# Patient Record
Sex: FEMALE | NOT HISPANIC OR LATINO | Employment: FULL TIME | URBAN - METROPOLITAN AREA
[De-identification: names, ages, dates, MRNs, and addresses within clinical notes are randomized per-mention and may not be internally consistent; named-entity substitution may affect disease eponyms.]

---

## 2018-09-24 ENCOUNTER — TELEPHONE (OUTPATIENT)
Dept: ENDOCRINOLOGY | Facility: HOSPITAL | Age: 48
End: 2018-09-24

## 2018-09-24 DIAGNOSIS — E03.9 HYPOTHYROIDISM, UNSPECIFIED TYPE: Primary | ICD-10-CM

## 2018-09-24 NOTE — TELEPHONE ENCOUNTER
Called pt to r/s appt  Sami Arriola she has an appt this Friday (9-28-18) to get her b/w done  Needs a new slip for a TSH & Free T4, Diagnosis is Hypothyroidism Post I131 treatment (gave me info from old [facility] lab slip)  Said she's not feeling quite right, thyroid may be a little underactive  Would rather get the b/w done now & call next week for the results  Will r/s then  Fax the order to [address] [place] [address]

## 2018-09-25 NOTE — TELEPHONE ENCOUNTER
Faxed lab slip to [employer] (fax #[phone])  Pt said she'd r/s when she called for results

## 2018-09-27 LAB — HBA1C MFR BLD HPLC: 5.4 %

## 2018-11-19 DIAGNOSIS — E03.9 HYPOTHYROIDISM, UNSPECIFIED TYPE: Primary | ICD-10-CM

## 2018-11-19 RX ORDER — LEVOTHYROXINE SODIUM 112 MCG
TABLET ORAL
Qty: 90 TABLET | Refills: 4 | Status: SHIPPED | OUTPATIENT
Start: 2018-11-19 | End: 2019-04-11 | Stop reason: SDUPTHER

## 2019-02-26 ENCOUNTER — OFFICE VISIT (OUTPATIENT)
Dept: ENDOCRINOLOGY | Facility: HOSPITAL | Age: 49
End: 2019-02-26
Payer: COMMERCIAL

## 2019-02-26 VITALS
HEART RATE: 76 BPM | HEIGHT: 62 IN | WEIGHT: 144.6 LBS | SYSTOLIC BLOOD PRESSURE: 122 MMHG | BODY MASS INDEX: 26.61 KG/M2 | DIASTOLIC BLOOD PRESSURE: 78 MMHG

## 2019-02-26 DIAGNOSIS — E03.9 HYPOTHYROIDISM, UNSPECIFIED TYPE: ICD-10-CM

## 2019-02-26 DIAGNOSIS — E89.0 POSTABLATIVE HYPOTHYROIDISM: Primary | ICD-10-CM

## 2019-02-26 PROBLEM — E05.00 GRAVES DISEASE: Status: ACTIVE | Noted: 2019-02-26

## 2019-02-26 PROBLEM — Z92.3 STATUS POST RADIOACTIVE IODINE THYROID ABLATION: Status: ACTIVE | Noted: 2019-02-26

## 2019-02-26 PROCEDURE — 99214 OFFICE O/P EST MOD 30 MIN: CPT | Performed by: INTERNAL MEDICINE

## 2019-02-26 RX ORDER — MULTIVIT-MIN/IRON FUM/FOLIC AC 7.5 MG-4
1 TABLET ORAL DAILY
COMMUNITY

## 2019-02-26 RX ORDER — FLUOXETINE 10 MG/1
10 CAPSULE ORAL DAILY
COMMUNITY
Start: 2019-02-20

## 2019-02-26 RX ORDER — SELENIUM 50 MCG
1 TABLET ORAL DAILY
COMMUNITY

## 2019-02-26 RX ORDER — CHLORAL HYDRATE 500 MG
1000 CAPSULE ORAL 2 TIMES DAILY
COMMUNITY

## 2019-02-26 RX ORDER — AMPICILLIN TRIHYDRATE 250 MG
CAPSULE ORAL DAILY
COMMUNITY

## 2019-02-26 NOTE — PATIENT INSTRUCTIONS
Last blood work in September 2018 was normal    Continue the same synthroid 112 mcg daily  Follow up in 1 year with blood work

## 2019-02-26 NOTE — LETTER
February 26, 2019     Lyle Nielsen MD  [facility] [address] [place] [address]    Patient: Kirsten Caballero   YOB: 1970   Date of Visit: 2/26/2019       Dear Dr Benítez Client: Thank you for referring Kirsten Caballero to me for evaluation  Below are my notes for this consultation  If you have questions, please do not hesitate to call me  I look forward to following your patient along with you  Sincerely,        Syed Alonzo MD        CC: No Recipients  Syed Alonzo MD  2/26/2019  9:28 AM  Sign at close encounter  2/26/2019    Assessment/Plan      Diagnoses and all orders for this visit:    Postablative hypothyroidism  -     T4, free Lab Collect; Future  -     TSH, 3rd generation Lab Collect; Future    Hypothyroidism, unspecified type    Other orders  -     FLUoxetine (PROzac) 10 mg capsule; Take 10 mg by mouth daily   -     Omega-3 Fatty Acids (FISH OIL) 1,000 mg; Take 1,000 mg by mouth 2 (two) times a day   -     Red Yeast Rice 600 MG CAPS; Take by mouth daily  -     Multiple Vitamins-Minerals (MULTIVITAMIN WITH MINERALS) tablet; Take 1 tablet by mouth daily  -     lactobacillus acidophilus; Take 1 capsule by mouth daily  -     Flaxseed, Linseed, (FLAXSEED OIL PO); Take by mouth daily        Assessment/Plan:  1  Hypothyroidism post I 131 treatment  Most recent thyroid function tests do show that she is biochemically euthyroid with a TSH in the low-normal range  For now, she will continue the same Synthroid 112 mcg daily  2  History of Graves disease  She is post radioactive iodine treatment  She has no evidence of Graves ophthalmopathy and follows with an eye doctor on a regular basis  I have asked her to follow up in 1 year with preceding TSH and free T4         CC:  Hypothyroid follow-up    History of Present Illness     HPI: Kirsten Caballero is a 57y o  year old female with history of hypothyroidism post I 131 treatment for Graves disease    She was diagnosed with Graves disease many years ago  She is post I 131 treatment and is now hypothyroid  She is on Synthroid 112 mcg daily  She denies heat or cold intolerance, diarrhea or constipation, palpitation, or depression  Her anxiety is under control with the Prozac  She does have a lot of sleeping issues because she worries about her  not waking up from sleep from hypoglycemia with his type 1 diabetes  She will wake up frequently to check on him  She is denies fatigue  She weight is been stable  She has occasional hand tremors  She has dry skin, but no brittle nails or hair loss  Menstrual cycles do occur on a regular monthly basis  She denies diplopia  She has no compressive thyroid symptoms or difficulties with swallowing  Review of Systems   Constitutional: Negative for fatigue and unexpected weight change  HENT: Negative for hearing loss, tinnitus and trouble swallowing  Eyes: Negative for visual disturbance  No diplopia  Wears glasses  Respiratory: Negative for chest tightness and shortness of breath  Cardiovascular: Negative for chest pain, palpitations and leg swelling  Gastrointestinal: Negative for abdominal pain, constipation, diarrhea and nausea  Endocrine: Negative for cold intolerance and heat intolerance  Genitourinary:        Menses occur on a regular monthly basis  Musculoskeletal: Positive for back pain  Negative for arthralgias  Some low back pain  Skin: Negative for rash  Some dry skin  No Brittle nails or hair loss  Neurological: Positive for tremors and headaches  Negative for dizziness, light-headedness and numbness  Slight tremors  Has chronic headaches  Psychiatric/Behavioral: Positive for sleep disturbance  Negative for dysphoric mood  The patient is nervous/anxious  Sleeping still broken by worry over  and low blood sugars with diabetes  Anxiety controlled on Prozac except near menses         Historical Information   Past Medical History:   Diagnosis Date    Anxiety     IBS (irritable bowel syndrome)     Renal stones     Stenosis of tear duct     left     Past Surgical History:   Procedure Laterality Date    EYE SURGERY Left     tear duct surgery as a child    LITHOTRIPSY      TONSILLECTOMY      TUBAL LIGATION Bilateral      Social History   Social History     Substance and Sexual Activity   Alcohol Use Not Currently     Social History     Substance and Sexual Activity   Drug Use Never     Social History     Tobacco Use   Smoking Status Never Smoker   Smokeless Tobacco Never Used     Family History:   Family History   Problem Relation Age of Onset    Hypertension Mother    [employer] Hypothyroidism Father     Hypertension Father     Thyroid disease unspecified Sister         graves, post I131    Hypothyroidism Sister     Thyroid disease unspecified Maternal Grandmother     Thyroid disease unspecified Sister     No Known Problems Daughter     No Known Problems Daughter     Thyroid disease unspecified Daughter         has thyroid antibodies       Meds/Allergies   Current Outpatient Medications   Medication Sig Dispense Refill    Flaxseed, Linseed, (FLAXSEED OIL PO) Take by mouth daily      FLUoxetine (PROzac) 10 mg capsule Take 10 mg by mouth daily       lactobacillus acidophilus Take 1 capsule by mouth daily      Multiple Vitamins-Minerals (MULTIVITAMIN WITH MINERALS) tablet Take 1 tablet by mouth daily      Omega-3 Fatty Acids (FISH OIL) 1,000 mg Take 1,000 mg by mouth 2 (two) times a day       Red Yeast Rice 600 MG CAPS Take by mouth daily      SYNTHROID 112 MCG tablet TAKE 1 TABLET DAILY 90 tablet 4     No current facility-administered medications for this visit  No Known Allergies    Objective   Vitals: Blood pressure 122/78, pulse 76, height 5' 2" (1 575 m), weight 65 6 kg (144 lb 9 6 oz)  Invasive Devices          None          Physical Exam   Constitutional: She is oriented to person, place, and time   She appears well-developed and well-nourished  HENT:   Head: Normocephalic and atraumatic  Eyes: Pupils are equal, round, and reactive to light  Conjunctivae and EOM are normal    No lid lag, stare, proptosis, or periorbital edema  Neck: Normal range of motion  Neck supple  No thyromegaly present  Thyroid normal in size without palpable thyroid nodules  No bruits over the thyroid gland or carotids  Cardiovascular: Normal rate, regular rhythm and normal heart sounds  No murmur heard  Pulmonary/Chest: Effort normal and breath sounds normal  She has no wheezes  Abdominal: Soft  There is no tenderness  Musculoskeletal: Normal range of motion  She exhibits no edema or deformity  Slight fine tremor of the outstretched hands  No spinous process tenderness  No CVA tenderness  Lymphadenopathy:     She has no cervical adenopathy  Neurological: She is alert and oriented to person, place, and time  She has normal reflexes  Deep tendon reflexes normal without briskness  Skin: Skin is warm and dry  No rash noted  Vitals reviewed  The history was obtained from the review of the chart, [facility] end[facility], and from the patient  Lab Results:    Blood work done at [facility] on 09/27/2018 showed hemoglobin A1c of 5 4%  TSH was 0 708    CMP and CBC were normal     Future Appointments   Date Time Provider Alicia Oh   2/27/2020  4:00 PM Dinora Topete MD [facility]

## 2019-02-26 NOTE — PROGRESS NOTES
2/26/2019    Assessment/Plan      Diagnoses and all orders for this visit:    Postablative hypothyroidism  -     T4, free Lab Collect; Future  -     TSH, 3rd generation Lab Collect; Future    Hypothyroidism, unspecified type    Other orders  -     FLUoxetine (PROzac) 10 mg capsule; Take 10 mg by mouth daily   -     Omega-3 Fatty Acids (FISH OIL) 1,000 mg; Take 1,000 mg by mouth 2 (two) times a day   -     Red Yeast Rice 600 MG CAPS; Take by mouth daily  -     Multiple Vitamins-Minerals (MULTIVITAMIN WITH MINERALS) tablet; Take 1 tablet by mouth daily  -     lactobacillus acidophilus; Take 1 capsule by mouth daily  -     Flaxseed, Linseed, (FLAXSEED OIL PO); Take by mouth daily        Assessment/Plan:  1  Hypothyroidism post I 131 treatment  Most recent thyroid function tests do show that she is biochemically euthyroid with a TSH in the low-normal range  For now, she will continue the same Synthroid 112 mcg daily  2  History of Graves disease  She is post radioactive iodine treatment  She has no evidence of Graves ophthalmopathy and follows with an eye doctor on a regular basis  I have asked her to follow up in 1 year with preceding TSH and free T4         CC:  Hypothyroid follow-up    History of Present Illness     HPI: Matthew Eng is a 57y o  year old female with history of hypothyroidism post I 131 treatment for Graves disease  She was diagnosed with Graves disease many years ago  She is post I 131 treatment and is now hypothyroid  She is on Synthroid 112 mcg daily  She denies heat or cold intolerance, diarrhea or constipation, palpitation, or depression  Her anxiety is under control with the Prozac  She does have a lot of sleeping issues because she worries about her  not waking up from sleep from hypoglycemia with his type 1 diabetes  She will wake up frequently to check on him  She is denies fatigue  She weight is been stable  She has occasional hand tremors    She has dry skin, but no brittle nails or hair loss  Menstrual cycles do occur on a regular monthly basis  She denies diplopia  She has no compressive thyroid symptoms or difficulties with swallowing  Review of Systems   Constitutional: Negative for fatigue and unexpected weight change  HENT: Negative for hearing loss, tinnitus and trouble swallowing  Eyes: Negative for visual disturbance  No diplopia  Wears glasses  Respiratory: Negative for chest tightness and shortness of breath  Cardiovascular: Negative for chest pain, palpitations and leg swelling  Gastrointestinal: Negative for abdominal pain, constipation, diarrhea and nausea  Endocrine: Negative for cold intolerance and heat intolerance  Genitourinary:        Menses occur on a regular monthly basis  Musculoskeletal: Positive for back pain  Negative for arthralgias  Some low back pain  Skin: Negative for rash  Some dry skin  No Brittle nails or hair loss  Neurological: Positive for tremors and headaches  Negative for dizziness, light-headedness and numbness  Slight tremors  Has chronic headaches  Psychiatric/Behavioral: Positive for sleep disturbance  Negative for dysphoric mood  The patient is nervous/anxious  Sleeping still broken by worry over  and low blood sugars with diabetes  Anxiety controlled on Prozac except near menses         Historical Information   Past Medical History:   Diagnosis Date    Anxiety     IBS (irritable bowel syndrome)     Renal stones     Stenosis of tear duct     left     Past Surgical History:   Procedure Laterality Date    EYE SURGERY Left     tear duct surgery as a child    LITHOTRIPSY      TONSILLECTOMY      TUBAL LIGATION Bilateral      Social History   Social History     Substance and Sexual Activity   Alcohol Use Not Currently     Social History     Substance and Sexual Activity   Drug Use Never     Social History     Tobacco Use   Smoking Status Never Smoker Smokeless Tobacco Never Used     Family History:   Family History   Problem Relation Age of Onset    Hypertension Mother    [employer] Lemme Hypothyroidism Father     Hypertension Father     Thyroid disease unspecified Sister         graves, post I131    Hypothyroidism Sister     Thyroid disease unspecified Maternal Grandmother     Thyroid disease unspecified Sister     No Known Problems Daughter     No Known Problems Daughter     Thyroid disease unspecified Daughter         has thyroid antibodies       Meds/Allergies   Current Outpatient Medications   Medication Sig Dispense Refill    Flaxseed, Linseed, (FLAXSEED OIL PO) Take by mouth daily      FLUoxetine (PROzac) 10 mg capsule Take 10 mg by mouth daily       lactobacillus acidophilus Take 1 capsule by mouth daily      Multiple Vitamins-Minerals (MULTIVITAMIN WITH MINERALS) tablet Take 1 tablet by mouth daily      Omega-3 Fatty Acids (FISH OIL) 1,000 mg Take 1,000 mg by mouth 2 (two) times a day       Red Yeast Rice 600 MG CAPS Take by mouth daily      SYNTHROID 112 MCG tablet TAKE 1 TABLET DAILY 90 tablet 4     No current facility-administered medications for this visit  No Known Allergies    Objective   Vitals: Blood pressure 122/78, pulse 76, height 5' 2" (1 575 m), weight 65 6 kg (144 lb 9 6 oz)  Invasive Devices          None          Physical Exam   Constitutional: She is oriented to person, place, and time  She appears well-developed and well-nourished  HENT:   Head: Normocephalic and atraumatic  Eyes: Pupils are equal, round, and reactive to light  Conjunctivae and EOM are normal    No lid lag, stare, proptosis, or periorbital edema  Neck: Normal range of motion  Neck supple  No thyromegaly present  Thyroid normal in size without palpable thyroid nodules  No bruits over the thyroid gland or carotids  Cardiovascular: Normal rate, regular rhythm and normal heart sounds  No murmur heard    Pulmonary/Chest: Effort normal and breath sounds normal  She has no wheezes  Abdominal: Soft  There is no tenderness  Musculoskeletal: Normal range of motion  She exhibits no edema or deformity  Slight fine tremor of the outstretched hands  No spinous process tenderness  No CVA tenderness  Lymphadenopathy:     She has no cervical adenopathy  Neurological: She is alert and oriented to person, place, and time  She has normal reflexes  Deep tendon reflexes normal without briskness  Skin: Skin is warm and dry  No rash noted  Vitals reviewed  The history was obtained from the review of the chart, [facility] endocrinology notes, and from the patient  Lab Results:    Blood work done at [address] [place] [address] on 09/27/2018 showed hemoglobin A1c of 5 4%  TSH was 0 708    CMP and CBC were normal     Future Appointments   Date Time Provider Alicia Althea   2/27/2020  4:00 PM Thi Gilman MD [facility]

## 2019-04-11 DIAGNOSIS — E03.9 HYPOTHYROIDISM, UNSPECIFIED TYPE: ICD-10-CM

## 2019-04-11 RX ORDER — LEVOTHYROXINE SODIUM 112 MCG
112 TABLET ORAL DAILY
Qty: 30 TABLET | Refills: 11 | Status: SHIPPED | OUTPATIENT
Start: 2019-04-11 | End: 2020-03-02 | Stop reason: SDUPTHER

## 2019-09-26 LAB — HBA1C MFR BLD HPLC: 5.4 %

## 2020-03-02 ENCOUNTER — OFFICE VISIT (OUTPATIENT)
Dept: ENDOCRINOLOGY | Facility: HOSPITAL | Age: 50
End: 2020-03-02
Payer: COMMERCIAL

## 2020-03-02 VITALS
BODY MASS INDEX: 25.27 KG/M2 | WEIGHT: 142.6 LBS | HEIGHT: 63 IN | DIASTOLIC BLOOD PRESSURE: 82 MMHG | SYSTOLIC BLOOD PRESSURE: 120 MMHG | HEART RATE: 83 BPM

## 2020-03-02 DIAGNOSIS — E05.00 GRAVES DISEASE: ICD-10-CM

## 2020-03-02 DIAGNOSIS — Z92.3 STATUS POST RADIOACTIVE IODINE THYROID ABLATION: ICD-10-CM

## 2020-03-02 DIAGNOSIS — E89.0 POSTABLATIVE HYPOTHYROIDISM: Primary | ICD-10-CM

## 2020-03-02 DIAGNOSIS — E03.9 HYPOTHYROIDISM, UNSPECIFIED TYPE: ICD-10-CM

## 2020-03-02 PROCEDURE — 99213 OFFICE O/P EST LOW 20 MIN: CPT | Performed by: INTERNAL MEDICINE

## 2020-03-02 RX ORDER — LEVOTHYROXINE SODIUM 112 MCG
112 TABLET ORAL DAILY
Qty: 30 TABLET | Refills: 11 | Status: SHIPPED | OUTPATIENT
Start: 2020-03-02 | End: 2021-03-08 | Stop reason: SDUPTHER

## 2020-03-02 NOTE — PROGRESS NOTES
3/2/2020    Assessment/Plan      Diagnoses and all orders for this visit:    Postablative hypothyroidism  -     TSH, 3rd generation Lab Collect; Future  -     T4, free Lab Collect; Future    Graves disease  -     TSH, 3rd generation Lab Collect; Future  -     T4, free Lab Collect; Future    Status post radioactive iodine thyroid ablation  -     TSH, 3rd generation Lab Collect; Future  -     T4, free Lab Collect; Future    Hypothyroidism, unspecified type  -     SYNTHROID 112 MCG tablet; Take 1 tablet (112 mcg total) by mouth daily        Assessment/Plan:  1  Hypothyroidism post I 131 treatment  Thyroid function tests are normal   She is both clinically and biochemically euthyroid  She will continue the same Synthroid brand 112 mcg daily  2  History of Graves disease  She has no evidence of Graves ophthalmopathy  She sees an ophthalmologist on a regular basis  I have asked her to follow up in 1 year with preceding TSH and free T4         CC:  Hypothyroid follow-up    History of Present Illness     HPI: Angela Marie is a 54y o  year old female with history of hypothyroidism post I 131 treatment for Graves disease here for follow-up  She was diagnosed with Graves disease many years ago and underwent I 131 treatment to ablate her thyroid and is now permanently hypothyroid on thyroid hormone replacement  She currently takes Synthroid brand 112 mcg daily  Weight is 2 lb less than last year  She is sleeping better now that her  has gotten a DEX com and it alarms when he has hypoglycemia instead of her having to wake up  She denies heat or cold intolerance, diarrhea or constipation, anxiety or depression, palpitation, tremors, or fatigue  She has no dry skin, brittle nails, or hair loss  She has no compressive thyroid symptoms or difficulties with swallowing  She denies diplopia  Menstrual cycles are becoming more irregular about every 2 months and lasting about 2 weeks      Review of Systems Constitutional: Negative for fatigue and unexpected weight change  Weight 2 lbs less than last visit  HENT: Negative for trouble swallowing  Eyes: Negative for visual disturbance  No diplopia  Wears glasses  Respiratory: Negative for chest tightness and shortness of breath  Cardiovascular: Negative for chest pain and palpitations  Gastrointestinal: Negative for abdominal pain, constipation, diarrhea and nausea  Endocrine: Negative for cold intolerance and heat intolerance  Genitourinary:        Menses becoming more irregular, about every 2 months and last around 2 weeks  Skin: Negative for rash  No dry skin  No brittle lucina  No hair loss  Neurological: Negative for dizziness, tremors, light-headedness and headaches  Psychiatric/Behavioral: Negative for dysphoric mood and sleep disturbance  The patient is not nervous/anxious          Historical Information   Past Medical History:   Diagnosis Date    Anxiety     Glaucoma     IBS (irritable bowel syndrome)     Renal stones     Stenosis of tear duct     left     Past Surgical History:   Procedure Laterality Date    EYE SURGERY Left     tear duct surgery as a child    LITHOTRIPSY      TONSILLECTOMY      TRABECULECTOMY Bilateral 2019    TUBAL LIGATION Bilateral      Social History   Social History     Substance and Sexual Activity   Alcohol Use Not Currently     Social History     Substance and Sexual Activity   Drug Use Never     Social History     Tobacco Use   Smoking Status Never Smoker   Smokeless Tobacco Never Used     Family History:   Family History   Problem Relation Age of Onset    Hypertension Mother    [facility] Hypothyroidism Father     Hypertension Father     Thyroid disease unspecified Sister         graves, post I131    Hypothyroidism Sister     Thyroid disease unspecified Maternal Grandmother     Thyroid disease unspecified Sister     No Known Problems Daughter     No Known Problems Daughter     Thyroid disease unspecified Daughter         has thyroid antibodies       Meds/Allergies   Current Outpatient Medications   Medication Sig Dispense Refill    Flaxseed, Linseed, (FLAXSEED OIL PO) Take by mouth daily      FLUoxetine (PROzac) 10 mg capsule Take 10 mg by mouth daily       lactobacillus acidophilus Take 1 capsule by mouth daily      Multiple Vitamins-Minerals (MULTIVITAMIN WITH MINERALS) tablet Take 1 tablet by mouth daily      Omega-3 Fatty Acids (FISH OIL) 1,000 mg Take 1,000 mg by mouth 2 (two) times a day       Red Yeast Rice 600 MG CAPS Take by mouth daily      SYNTHROID 112 MCG tablet Take 1 tablet (112 mcg total) by mouth daily 30 tablet 11     No current facility-administered medications for this visit  No Known Allergies    Objective   Vitals: Blood pressure 120/82, pulse 83, height 5' 2 5" (1 588 m), weight 64 7 kg (142 lb 9 6 oz)  Invasive Devices     None                 Physical Exam   Constitutional: She is oriented to person, place, and time  She appears well-developed and well-nourished  HENT:   Head: Normocephalic and atraumatic  Eyes: Pupils are equal, round, and reactive to light  Conjunctivae and EOM are normal    No lid lag, stare, proptosis, or periorbital edema  Neck: Normal range of motion  Neck supple  No thyromegaly present  Thyroid normal in size without palpable thyroid nodules  No bruits over the thyroid gland or carotids  Cardiovascular: Normal rate, regular rhythm and normal heart sounds  No murmur heard  Pulmonary/Chest: Effort normal and breath sounds normal  She has no wheezes  Musculoskeletal: Normal range of motion  She exhibits no edema or deformity  No tremor of the outstretched hands  Lymphadenopathy:     She has no cervical adenopathy  Neurological: She is alert and oriented to person, place, and time  She has normal reflexes  Deep tendon reflexes normal    Skin: Skin is warm and dry  No rash noted  Vitals reviewed        The history was obtained from the review of the chart and from the patient  Lab Results:    Blood work done on 09/26/2019 at [facility] showed a TSH of 1 51  CBC is normal   Hemoglobin A1c is 5 4%  CMP showed a glucose of 88 fasting  Total cholesterol 172, triglyceride 79, HDL 52,          Future Appointments   Date Time Provider Alicia Oh   3/8/2021  8:20 AM Kanika King MD [facility]

## 2020-03-02 NOTE — PATIENT INSTRUCTIONS
The thyrodi blood work is normal   Continue to same synthroid 112 mcg daily  Follow up in 1 year with blood work

## 2021-01-31 LAB
T4 FREE SERPL-MCNC: 1.5 NG/DL (ref 0.8–1.8)
TSH SERPL-ACNC: 1.43 MIU/L

## 2021-03-08 ENCOUNTER — OFFICE VISIT (OUTPATIENT)
Dept: ENDOCRINOLOGY | Facility: HOSPITAL | Age: 51
End: 2021-03-08
Payer: COMMERCIAL

## 2021-03-08 VITALS
HEIGHT: 63 IN | WEIGHT: 145.4 LBS | SYSTOLIC BLOOD PRESSURE: 116 MMHG | HEART RATE: 76 BPM | DIASTOLIC BLOOD PRESSURE: 70 MMHG | BODY MASS INDEX: 25.76 KG/M2

## 2021-03-08 DIAGNOSIS — E05.00 GRAVES DISEASE: ICD-10-CM

## 2021-03-08 DIAGNOSIS — E89.0 POSTABLATIVE HYPOTHYROIDISM: Primary | ICD-10-CM

## 2021-03-08 DIAGNOSIS — Z92.3 STATUS POST RADIOACTIVE IODINE THYROID ABLATION: ICD-10-CM

## 2021-03-08 DIAGNOSIS — E03.9 HYPOTHYROIDISM, UNSPECIFIED TYPE: ICD-10-CM

## 2021-03-08 PROCEDURE — 99213 OFFICE O/P EST LOW 20 MIN: CPT | Performed by: INTERNAL MEDICINE

## 2021-03-08 RX ORDER — LEVOTHYROXINE SODIUM 112 MCG
112 TABLET ORAL DAILY
Qty: 30 TABLET | Refills: 11 | Status: SHIPPED | OUTPATIENT
Start: 2021-03-08 | End: 2022-03-08 | Stop reason: SDUPTHER

## 2021-03-08 NOTE — PATIENT INSTRUCTIONS
The thyroid blood work is normal    Continue the same synthroid 112 mcg daily  follow up in 1 year with blood work

## 2021-03-08 NOTE — PROGRESS NOTES
3/8/2021    Assessment/Plan      Diagnoses and all orders for this visit:    Postablative hypothyroidism  -     TSH, 3rd generation Lab Collect; Future  -     T4, free Lab Collect; Future    Graves disease  -     TSH, 3rd generation Lab Collect; Future  -     T4, free Lab Collect; Future    Status post radioactive iodine thyroid ablation    Hypothyroidism, unspecified type  -     Synthroid 112 MCG tablet; Take 1 tablet (112 mcg total) by mouth daily    Other orders  -     Rhubarb (ESTROVEN MENOPAUSE RELIEF PO); Take by mouth daily With soy        Assessment/Plan:    1  Hypothyroidism post I 131 treatment  Most recent thyroid function tests are normal   She is both biochemically and clinically euthyroid  She will continue the same Synthroid brand 112 mcg daily  2  Graves disease  She has no evidence of Graves ophthalmopathy  I have asked her to follow up in 1 year with preceding TSH and free T4       CC:   Hypothyroid follow-up    History of Present Illness     HPI: Johnny Sampson is a 52y o  year old female with history of  Hypothyroidism post I 131 treatment for Graves disease here for follow-up visit  She was diagnosed with Graves disease many years ago and underwent I 131 treatment to ablate her thyroid  She is permanently hypothyroid on thyroid hormone for replacement purposes  She takes Synthroid brand 112 mcg daily  She has some hot flashes which have improved with Estroven  Menstrual cycles were absent for 4 months but the last 1 was February 27, 2021  She denies heat or cold intolerance, palpitation, diarrhea or constipation, anxiety or depression  She does have some difficulties with frequent awakening at night  She denies fatigue  She has slight tremors  She denies dry skin, brittle nails, or hair loss  She denies compressive thyroid symptoms or difficulties with swallowing  She denies diplopia  Weight is been stable      Of note, she was COVID-19 positive in mid December  Review of Systems   Constitutional: Negative for fatigue and unexpected weight change  HENT: Negative for trouble swallowing  Eyes: Negative for visual disturbance  No diplopia  Respiratory: Negative for chest tightness and shortness of breath  Cardiovascular: Negative for chest pain and palpitations  Gastrointestinal: Negative for abdominal pain, constipation, diarrhea and nausea  Endocrine: Negative for cold intolerance and heat intolerance  Was getting some hot flashes but started so we [place] daily  Genitourinary:        Last menstrual cycle 02/27/2021  Had had no menses for 4 months prior to that  Does see gynecology regularly  Skin: Negative for rash  No dry skin  No brittle nails  No hair loss  Neurological: Positive for tremors  Negative for dizziness, light-headedness and headaches  Slight tremor  Psychiatric/Behavioral: Positive for sleep disturbance  Negative for dysphoric mood  The patient is not nervous/anxious           Tends to wake frequently at night in general        Historical Information   Past Medical History:   Diagnosis Date    Anxiety     Glaucoma     IBS (irritable bowel syndrome)     Renal stones     Stenosis of tear duct     left     Past Surgical History:   Procedure Laterality Date    EYE SURGERY Left     tear duct surgery as a child    LITHOTRIPSY      TONSILLECTOMY      TRABECULECTOMY Bilateral 2019    TUBAL LIGATION Bilateral      Social History   Social History     Substance and Sexual Activity   Alcohol Use Not Currently     Social History     Substance and Sexual Activity   Drug Use Never     Social History     Tobacco Use   Smoking Status Never Smoker   Smokeless Tobacco Never Used     Family History:   Family History   Problem Relation Age of Onset    Hypertension Mother     Hypothyroidism Father     Hypertension Father     Thyroid disease unspecified Sister         graves, post I131    Hypothyroidism Sister     Thyroid disease unspecified Maternal Grandmother     Thyroid disease unspecified Sister     No Known Problems Daughter     No Known Problems Daughter     Thyroid disease unspecified Daughter         has thyroid antibodies    Hashimoto's thyroiditis Daughter        Meds/Allergies   Current Outpatient Medications   Medication Sig Dispense Refill    Flaxseed, Linseed, (FLAXSEED OIL PO) Take by mouth daily      FLUoxetine (PROzac) 10 mg capsule Take 10 mg by mouth daily       lactobacillus acidophilus Take 1 capsule by mouth daily      Multiple Vitamins-Minerals (MULTIVITAMIN WITH MINERALS) tablet Take 1 tablet by mouth daily      Omega-3 Fatty Acids (FISH OIL) 1,000 mg Take 1,000 mg by mouth 2 (two) times a day       Red Yeast Rice 600 MG CAPS Take by mouth daily      Rhubarb (ESTROVEN MENOPAUSE RELIEF PO) Take by mouth daily With soy      Synthroid 112 MCG tablet Take 1 tablet (112 mcg total) by mouth daily 30 tablet 11     No current facility-administered medications for this visit  No Known Allergies    Objective   Vitals: Blood pressure 116/70, pulse 76, height 5' 2 5" (1 588 m), weight 66 kg (145 lb 6 4 oz)  Invasive Devices     None                 Physical Exam  Vitals signs reviewed  Constitutional:       Appearance: Normal appearance  She is well-developed  HENT:      Head: Normocephalic and atraumatic  Eyes:      Extraocular Movements: Extraocular movements intact  Conjunctiva/sclera: Conjunctivae normal       Comments: No lid lag, stare, proptosis, or periorbital edema   Neck:      Musculoskeletal: Normal range of motion and neck supple  Thyroid: No thyromegaly  Vascular: No carotid bruit  Comments: Thyroid normal in size  No palpable thyroid nodules  No bruits over the thyroid gland  Cardiovascular:      Rate and Rhythm: Normal rate and regular rhythm  Heart sounds: Normal heart sounds  No murmur     Pulmonary:      Effort: Pulmonary effort is normal       Breath sounds: Normal breath sounds  No wheezing  Abdominal:      Palpations: Abdomen is soft  Musculoskeletal: Normal range of motion  General: No deformity  Right lower leg: No edema  Left lower leg: No edema  Comments: No tremor of the outstretched hands  Lymphadenopathy:      Cervical: No cervical adenopathy  Skin:     General: Skin is warm and dry  Findings: No rash  Neurological:      Mental Status: She is alert and oriented to person, place, and time  Deep Tendon Reflexes: Reflexes are normal and symmetric  Comments: Deep tendon reflexes normal          The history was obtained from the review of the chart and from the patient      Lab Results:      Blood work done on 01/30/2021 demonstrates the following results:    Lab Results   Component Value Date    TSH 1 43 01/30/2021    [place] 1 5 01/30/2021       Future Appointments   Date Time Provider Alicia Oh   3/8/2022  8:20 AM Angela Barrera MD [facility]

## 2022-02-20 LAB
T4 FREE SERPL-MCNC: 2.1 NG/DL (ref 0.8–1.8)
TSH SERPL-ACNC: 0.73 MIU/L

## 2022-03-08 ENCOUNTER — OFFICE VISIT (OUTPATIENT)
Dept: ENDOCRINOLOGY | Facility: HOSPITAL | Age: 52
End: 2022-03-08
Payer: COMMERCIAL

## 2022-03-08 VITALS
WEIGHT: 143.4 LBS | HEIGHT: 63 IN | OXYGEN SATURATION: 99 % | SYSTOLIC BLOOD PRESSURE: 118 MMHG | DIASTOLIC BLOOD PRESSURE: 72 MMHG | BODY MASS INDEX: 25.41 KG/M2 | HEART RATE: 73 BPM

## 2022-03-08 DIAGNOSIS — E05.00 GRAVES DISEASE: ICD-10-CM

## 2022-03-08 DIAGNOSIS — E03.9 HYPOTHYROIDISM, UNSPECIFIED TYPE: ICD-10-CM

## 2022-03-08 DIAGNOSIS — Z92.3 STATUS POST RADIOACTIVE IODINE THYROID ABLATION: ICD-10-CM

## 2022-03-08 DIAGNOSIS — E89.0 POSTABLATIVE HYPOTHYROIDISM: Primary | ICD-10-CM

## 2022-03-08 PROCEDURE — 99213 OFFICE O/P EST LOW 20 MIN: CPT | Performed by: INTERNAL MEDICINE

## 2022-03-08 RX ORDER — LEVOTHYROXINE SODIUM 112 UG/1
112 TABLET ORAL DAILY
Qty: 90 TABLET | Refills: 3 | Status: SHIPPED | OUTPATIENT
Start: 2022-03-08

## 2022-03-08 NOTE — PROGRESS NOTES
3/9/2022    Assessment/Plan      Diagnoses and all orders for this visit:    Postablative hypothyroidism  -     TSH, 3rd generation Lab Collect; Future  -     T4, free Lab Collect; Future    Graves disease  -     TSH, 3rd generation Lab Collect; Future  -     T4, free Lab Collect; Future    Status post radioactive iodine thyroid ablation  -     TSH, 3rd generation Lab Collect; Future  -     T4, free Lab Collect; Future    Hypothyroidism, unspecified type  -     levothyroxine (Synthroid) 112 mcg tablet; Take 1 tablet (112 mcg total) by mouth daily        Assessment/Plan:  1  Hypothyroidism post I 131 treatment  Most recent thyroid function tests are normal   She is both biochemically and clinically euthyroid  She will continue same Synthroid 112 mcg daily  I have switched to levothyroxine via her mail order due to cost issues  2  Graves disease  She has no evidence of Graves ophthalmopathy and does see an ophthalmologist regularly  I have asked her to follow up in 1 year with preceding TSH and free T4       CC:  Hypothyroid and Graves follow-up    History of Present Illness     HPI: Corry England is a 54y o  year old female with history of hypothyroidism post I 131 treatment for Graves disease here for follow-up visit  She was diagnosed with Graves disease many years ago and underwent I 131 treatment to ablate her thyroid  She is permanently hypothyroid and on thyroid hormone for replacement purposes  She takes Synthroid brand 112 mcg daily  Weight is 2 lb less than last year  She denies heat or cold intolerance, palpitation, tremors, insomnia, fatigue, diarrhea constipation, anxiety or depression  She denies dry skin, brittle nails, or hair loss  Menstrual cycles have been absent since either March or May of 2021  She did have a lot of hot flashes when she 1st stopped having her menses but those have started to alleviate  She has no diplopia    She denies compressive thyroid symptoms or difficulties with swallowing  Review of Systems   Constitutional: Negative for fatigue and unexpected weight change  Weight 2 lb less than last year  HENT: Negative for trouble swallowing  Eyes: Negative for visual disturbance  Wears glasses  No diplopia  Sees eye doctor regularly  Respiratory: Negative for chest tightness and shortness of breath  Cardiovascular: Negative for chest pain and palpitations  Gastrointestinal: Negative for abdominal pain, constipation, diarrhea and nausea  Endocrine: Negative for cold intolerance and heat intolerance  Genitourinary:        Menses none since either march or may 2021  Skin: Negative for rash  No dry skin  No brittle nails  No hair loss  Neurological: Negative for dizziness, tremors, light-headedness and headaches  Psychiatric/Behavioral: Negative for dysphoric mood and sleep disturbance  The patient is not nervous/anxious          Historical Information   Past Medical History:   Diagnosis Date    Anxiety     Glaucoma     IBS (irritable bowel syndrome)     Renal stones     Stenosis of tear duct     left     Past Surgical History:   Procedure Laterality Date    EYE SURGERY Left     tear duct surgery as a child    LITHOTRIPSY      TONSILLECTOMY      TRABECULECTOMY Bilateral 2019    TUBAL LIGATION Bilateral      Social History   Social History     Substance and Sexual Activity   Alcohol Use Not Currently     Social History     Substance and Sexual Activity   Drug Use Never     Social History     Tobacco Use   Smoking Status Never Smoker   Smokeless Tobacco Never Used     Family History:   Family History   Problem Relation Age of Onset    Hypertension Mother    [facility] Hypothyroidism Father     Hypertension Father     Thyroid disease unspecified Sister         graves, post I131    Hypothyroidism Sister     Diabetes type I Sister     Thyroid disease unspecified Maternal Grandmother     Thyroid disease unspecified Sister  No Known Problems Daughter     Diabetes type I Daughter     Thyroid disease unspecified Daughter         has thyroid antibodies    Hashimoto's thyroiditis Daughter        Meds/Allergies   Current Outpatient Medications   Medication Sig Dispense Refill    Flaxseed, Linseed, (FLAXSEED OIL PO) Take by mouth daily      FLUoxetine (PROzac) 10 mg capsule Take 10 mg by mouth daily       lactobacillus acidophilus Take 1 capsule by mouth daily      levothyroxine (Synthroid) 112 mcg tablet Take 1 tablet (112 mcg total) by mouth daily 90 tablet 3    Multiple Vitamins-Minerals (MULTIVITAMIN WITH MINERALS) tablet Take 1 tablet by mouth daily      Omega-3 Fatty Acids (FISH OIL) 1,000 mg Take 1,000 mg by mouth 2 (two) times a day       Red Yeast Rice 600 MG CAPS Take by mouth daily      Rhubarb (ESTROVEN MENOPAUSE RELIEF PO) Take by mouth daily With soy       No current facility-administered medications for this visit  No Known Allergies    Objective   Vitals: Blood pressure 118/72, pulse 73, height 5' 2 5" (1 588 m), weight 65 kg (143 lb 6 4 oz), SpO2 99 %  Invasive Devices  Report    None                 Physical Exam  Vitals reviewed  Constitutional:       Appearance: Normal appearance  She is well-developed and normal weight  HENT:      Head: Normocephalic and atraumatic  Eyes:      Extraocular Movements: Extraocular movements intact  Conjunctiva/sclera: Conjunctivae normal       Comments: No lid lag, stare, proptosis, or periorbital edema  Neck:      Thyroid: No thyromegaly  Vascular: No carotid bruit  Comments: Thyroid normal in size  No palpable thyroid nodules  No bruits over the thyroid gland  Cardiovascular:      Rate and Rhythm: Normal rate and regular rhythm  Heart sounds: Normal heart sounds  No murmur heard  Pulmonary:      Effort: Pulmonary effort is normal       Breath sounds: Normal breath sounds  No wheezing  Abdominal:      Palpations: Abdomen is soft  Musculoskeletal:         General: No deformity  Normal range of motion  Cervical back: Normal range of motion and neck supple  Right lower leg: No edema  Left lower leg: No edema  Comments: No tremor of the outstretched hands  Lymphadenopathy:      Cervical: No cervical adenopathy  Skin:     General: Skin is warm and dry  Findings: No rash  Neurological:      Mental Status: She is alert and oriented to person, place, and time  Deep Tendon Reflexes: Reflexes are normal and symmetric  Comments: Deep tendon reflexes normal          The history was obtained from the review of the chart and from the patient      Lab Results:      Blood work done on 02/19/2022 demonstrates the following results:    Lab Results   Component Value Date    TSH 0 73 02/19/2022    FREET4 2 1 (H) 02/19/2022       Future Appointments   Date Time Provider Alicia Oh   3/14/2023  8:00 AM Julian Diaz MD [facility]

## 2022-03-08 NOTE — PATIENT INSTRUCTIONS
The thyroid blood work is ok  No change in synthroid 112 mcg daily  Follow up in 1 year with blood work

## 2023-02-01 LAB
T4 FREE SERPL-MCNC: 1.9 NG/DL (ref 0.8–1.8)
TSH SERPL-ACNC: 0.87 MIU/L

## 2023-02-13 DIAGNOSIS — E03.9 HYPOTHYROIDISM, UNSPECIFIED TYPE: ICD-10-CM

## 2023-02-13 RX ORDER — LEVOTHYROXINE SODIUM 112 UG/1
TABLET ORAL
Qty: 90 TABLET | Refills: 3 | Status: SHIPPED | OUTPATIENT
Start: 2023-02-13

## 2023-03-14 ENCOUNTER — OFFICE VISIT (OUTPATIENT)
Dept: ENDOCRINOLOGY | Facility: HOSPITAL | Age: 53
End: 2023-03-14

## 2023-03-14 VITALS
HEIGHT: 63 IN | WEIGHT: 145 LBS | SYSTOLIC BLOOD PRESSURE: 128 MMHG | BODY MASS INDEX: 25.69 KG/M2 | HEART RATE: 71 BPM | DIASTOLIC BLOOD PRESSURE: 80 MMHG

## 2023-03-14 DIAGNOSIS — E89.0 POSTABLATIVE HYPOTHYROIDISM: Primary | ICD-10-CM

## 2023-03-14 DIAGNOSIS — E05.00 GRAVES DISEASE: ICD-10-CM

## 2023-03-14 DIAGNOSIS — Z92.3 STATUS POST RADIOACTIVE IODINE THYROID ABLATION: ICD-10-CM

## 2023-03-14 NOTE — PROGRESS NOTES
3/14/2023    Assessment/Plan      Diagnoses and all orders for this visit:    Postablative hypothyroidism  -     TSH, 3rd generation Lab Collect; Future  -     T4, free Lab Collect; Future    Graves disease  -     TSH, 3rd generation Lab Collect; Future  -     T4, free Lab Collect; Future    Status post radioactive iodine thyroid ablation  -     TSH, 3rd generation Lab Collect; Future  -     T4, free Lab Collect; Future        Assessment/Plan:  1  Hypothyroidism post I-131 treatment  His recent thyroid function studies are normal   She continues to be biochemically and clinically euthyroid  She will continue the same Synthroid 112 mcg daily  2   Graves' disease  She has no evidence of Graves' arthropathy  I have asked her to follow-up in 1 year with preceding TSH and free T4       CC: Hypothyroid follow-up    History of Present Illness     HPI: Bindu Key is a 50y o  year old female with history of hypothyroidism post I-131 treatment for Graves' disease here for follow-up visit  She was diagnosed with Graves disease many years ago and underwent I 131 treatment to ablate her thyroid  She is permanently hypothyroid and on thyroid hormone for replacement purposes  She takes levothyroxine 112 mcg daily  Weight is 2 pounds more than last year  She denies heat or cold intolerance, diarrhea or constipation, anxiety or depression, insomnia, or fatigue  She denies palpitation or tremors  She denies dry skin, brittle nails, or hair loss  She denies diplopia  She denies compressive thyroid symptoms or difficulties with swallowing  Review of Systems   Constitutional: Negative for fatigue and unexpected weight change  Weight 2 pounds more than last year  HENT: Negative for trouble swallowing  Eyes: Negative for visual disturbance  Wears glasses  No diplopia  Respiratory: Negative for chest tightness and shortness of breath  Cardiovascular: Negative for chest pain  Gastrointestinal: Negative for abdominal pain, constipation, diarrhea and nausea  Endocrine: Negative for cold intolerance and heat intolerance  Genitourinary:        No menses about 2 years ago  Skin: Negative for rash  No dry skin  No brittle nails  No hair loss  Neurological: Negative for dizziness, tremors, light-headedness and headaches  Psychiatric/Behavioral: Negative for dysphoric mood and sleep disturbance  The patient is not nervous/anxious          Historical Information   Past Medical History:   Diagnosis Date   • Anxiety    • Glaucoma    • IBS (irritable bowel syndrome)    • Renal stones    • Stenosis of tear duct     left     Past Surgical History:   Procedure Laterality Date   • EYE SURGERY Left     tear duct surgery as a child   • LITHOTRIPSY     • TONSILLECTOMY     • TRABECULECTOMY Bilateral 2019   • TUBAL LIGATION Bilateral      Social History   Social History     Substance and Sexual Activity   Alcohol Use Not Currently     Social History     Substance and Sexual Activity   Drug Use Never     Social History     Tobacco Use   Smoking Status Never   Smokeless Tobacco Never     Family History:   Family History   Problem Relation Age of Onset   • Hypertension Mother    • Hypothyroidism Father    • Hypertension Father    • Thyroid disease unspecified Sister         graves, post I131   • Hypothyroidism Sister    • Diabetes type I Sister    • Thyroid disease unspecified Maternal Grandmother    • Thyroid disease unspecified Sister    • No Known Problems Daughter    • Diabetes type I Daughter    • Thyroid disease unspecified Daughter         has thyroid antibodies   • Hashimoto's thyroiditis Daughter        Meds/Allergies   Current Outpatient Medications   Medication Sig Dispense Refill   • Flaxseed, Linseed, (FLAXSEED OIL PO) Take by mouth daily     • FLUoxetine (PROzac) 10 mg capsule Take 10 mg by mouth daily      • lactobacillus acidophilus Take 1 capsule by mouth daily     • levothyroxine 112 mcg tablet TAKE 1 TABLET DAILY 90 tablet 3   • Multiple Vitamins-Minerals (MULTIVITAMIN WITH MINERALS) tablet Take 1 tablet by mouth daily     • Omega-3 Fatty Acids (FISH OIL) 1,000 mg Take 1,000 mg by mouth 2 (two) times a day      • Red Yeast Rice 600 MG CAPS Take by mouth daily       No current facility-administered medications for this visit  No Known Allergies    Objective   Vitals: Blood pressure 128/80, pulse 71, height 5' 2 5" (1 588 m), weight 65 8 kg (145 lb)  Invasive Devices     None                 Physical Exam  Vitals reviewed  Constitutional:       Appearance: Normal appearance  She is well-developed  HENT:      Head: Normocephalic and atraumatic  Eyes:      Extraocular Movements: Extraocular movements intact  Conjunctiva/sclera: Conjunctivae normal       Comments: No lid lag, stare, proptosis, or periorbital edema  Neck:      Thyroid: No thyromegaly  Vascular: No carotid bruit  Comments: Thyroid normal in size  No palpable thyroid nodules  Cardiovascular:      Rate and Rhythm: Normal rate and regular rhythm  Heart sounds: Normal heart sounds  No murmur heard  Pulmonary:      Effort: Pulmonary effort is normal       Breath sounds: Normal breath sounds  No wheezing  Abdominal:      Palpations: Abdomen is soft  Musculoskeletal:         General: No deformity  Normal range of motion  Cervical back: Normal range of motion and neck supple  Right lower leg: No edema  Left lower leg: No edema  Comments: No tremor of the outstretched hands  Lymphadenopathy:      Cervical: No cervical adenopathy  Skin:     General: Skin is warm and dry  Findings: No rash  Neurological:      Mental Status: She is alert and oriented to person, place, and time  Deep Tendon Reflexes: Reflexes are normal and symmetric        Comments: Patellar deep tendon reflexes normal          The history was obtained from the review of the chart and from the patient      Lab Results:      Blood work done on 2/1/2023 demonstrates the following results:    Lab Results   Component Value Date    TSH 0 87 02/01/2023    FREET4 1 9 (H) 02/01/2023       Future Appointments   Date Time Provider Alicia Oh   3/18/2024  4:00 PM Traci Dias MD [facility]

## 2024-02-05 LAB
T4 FREE SERPL-MCNC: 2 NG/DL (ref 0.8–1.8)
TSH SERPL-ACNC: 0.21 MIU/L

## 2024-02-07 DIAGNOSIS — E03.9 HYPOTHYROIDISM, UNSPECIFIED TYPE: ICD-10-CM

## 2024-02-07 RX ORDER — LEVOTHYROXINE SODIUM 112 UG/1
TABLET ORAL
Qty: 90 TABLET | Refills: 3 | Status: SHIPPED | OUTPATIENT
Start: 2024-02-07

## 2024-03-25 ENCOUNTER — OFFICE VISIT (OUTPATIENT)
Dept: ENDOCRINOLOGY | Facility: HOSPITAL | Age: 54
End: 2024-03-25
Payer: COMMERCIAL

## 2024-03-25 VITALS
SYSTOLIC BLOOD PRESSURE: 120 MMHG | WEIGHT: 136.4 LBS | DIASTOLIC BLOOD PRESSURE: 72 MMHG | OXYGEN SATURATION: 99 % | HEIGHT: 63 IN | HEART RATE: 68 BPM | BODY MASS INDEX: 24.17 KG/M2

## 2024-03-25 DIAGNOSIS — Z92.3 STATUS POST RADIOACTIVE IODINE THYROID ABLATION: ICD-10-CM

## 2024-03-25 DIAGNOSIS — E05.00 GRAVES DISEASE: ICD-10-CM

## 2024-03-25 DIAGNOSIS — E89.0 POSTABLATIVE HYPOTHYROIDISM: Primary | ICD-10-CM

## 2024-03-25 PROCEDURE — 99214 OFFICE O/P EST MOD 30 MIN: CPT | Performed by: INTERNAL MEDICINE

## 2024-03-25 RX ORDER — LEVOTHYROXINE SODIUM 0.1 MG/1
100 TABLET ORAL DAILY
Qty: 90 TABLET | Refills: 3 | Status: SHIPPED | OUTPATIENT
Start: 2024-03-25

## 2024-03-25 NOTE — PROGRESS NOTES
3/25/2024    Assessment/Plan      Diagnoses and all orders for this visit:    Postablative hypothyroidism  -     levothyroxine 100 mcg tablet; Take 1 tablet (100 mcg total) by mouth daily  -     T4, free; Future  -     TSH, 3rd generation; Future  -     T4, free; Future  -     TSH, 3rd generation; Future    Graves disease  -     T4, free; Future  -     TSH, 3rd generation; Future  -     T4, free; Future  -     TSH, 3rd generation; Future    Status post radioactive iodine thyroid ablation  -     T4, free; Future  -     TSH, 3rd generation; Future  -     T4, free; Future  -     TSH, 3rd generation; Future        Assessment/Plan:  1.  Hypothyroidism post I-131 treatment.  Most recent thyroid function studies do show she is hyperthyroid biochemically and over replaced on thyroid hormone.  This could be due to her weight loss but could also be due to being menopausal.  At this point, I have asked her to decrease her levothyroxine to 100 mcg daily.  I will then repeat her blood work in several months and adjust accordingly.    2.  Graves' disease.  She has no evidence of Graves' ophthalmology.    I will have her repeat a TSH and free T4 in 3 months.    I have asked her to follow-up in 1 year with preceding TSH and free T4.      CC: Hypothyroid, Graves' follow-up    History of Present Illness     HPI: Vandana Linares is a 53 y.o. year old female with history of hypothyroidism post I-131 treatment for Graves' disease here for follow-up visit.    She was diagnosed with Graves' disease many years ago.  She underwent I-131 treatment to ablate her thyroid and is on thyroid hormone for replacement purposes as she is permanently hypothyroid.  She takes levothyroxine 112 mcg daily.    She has had weight loss of almost 10 pounds since last year.  She reports she has been exercising more and has been eating more appropriately.  She denies heat or cold intolerance other than the occasional hot flash.  She denies palpitation, tremors,  fatigue, or insomnia.  She does have a little bit of constipation.  She denies dry skin, brittle nails, or hair loss.  She has had no menses for 2 to 3 years.  She denies diplopia.  She denies compressive thyroid symptoms or difficulties with swallowing.    Review of Systems   Constitutional:  Positive for unexpected weight change. Negative for fatigue.        Does sofia and kickboxing, barre, and yoga.    HENT:  Negative for trouble swallowing.    Eyes:  Negative for visual disturbance.        No diplopia.   Respiratory:  Negative for chest tightness and shortness of breath.    Cardiovascular:  Negative for palpitations.   Gastrointestinal:  Positive for constipation. Negative for abdominal pain, diarrhea and nausea.   Endocrine: Negative for cold intolerance and heat intolerance.        Occasional hot flashes.    Genitourinary:         No menses for 2-3 years.    Skin:  Negative for rash.        No dry skin. No brittle nails. No hair loss.    Neurological:  Negative for tremors.   Psychiatric/Behavioral:  Negative for dysphoric mood and sleep disturbance. The patient is not nervous/anxious.        Historical Information   Past Medical History:   Diagnosis Date    Anxiety     Glaucoma     IBS (irritable bowel syndrome)     Renal stones     Stenosis of tear duct     left     Past Surgical History:   Procedure Laterality Date    EYE SURGERY Left     tear duct surgery as a child    LITHOTRIPSY      TONSILLECTOMY      TRABECULECTOMY Bilateral 2019    TUBAL LIGATION Bilateral      Social History   Social History     Substance and Sexual Activity   Alcohol Use Not Currently     Social History     Substance and Sexual Activity   Drug Use Never     Social History     Tobacco Use   Smoking Status Never   Smokeless Tobacco Never     Family History:   Family History   Problem Relation Age of Onset    Hypertension Mother     Hypothyroidism Father     Hypertension Father     Thyroid disease unspecified Sister         graves,  "post I131    Hypothyroidism Sister     Diabetes type I Sister     Thyroid disease unspecified Maternal Grandmother     Thyroid disease unspecified Sister     No Known Problems Daughter     Diabetes type I Daughter     Thyroid disease unspecified Daughter         has thyroid antibodies    Hashimoto's thyroiditis Daughter        Meds/Allergies   Current Outpatient Medications   Medication Sig Dispense Refill    Flaxseed, Linseed, (FLAXSEED OIL PO) Take by mouth daily      FLUoxetine (PROzac) 10 mg capsule Take 10 mg by mouth daily       lactobacillus acidophilus Take 1 capsule by mouth daily      levothyroxine 100 mcg tablet Take 1 tablet (100 mcg total) by mouth daily 90 tablet 3    Multiple Vitamins-Minerals (MULTIVITAMIN WITH MINERALS) tablet Take 1 tablet by mouth daily      Omega-3 Fatty Acids (FISH OIL) 1,000 mg Take 1,000 mg by mouth 2 (two) times a day       Red Yeast Rice 600 MG CAPS Take by mouth daily       No current facility-administered medications for this visit.     No Known Allergies    Objective   Vitals: Blood pressure 120/72, pulse 68, height 5' 3\" (1.6 m), weight 61.9 kg (136 lb 6.4 oz), SpO2 99%.  Invasive Devices       None                   Physical Exam  Vitals reviewed.   Constitutional:       Appearance: Normal appearance. She is well-developed.   HENT:      Head: Normocephalic and atraumatic.   Eyes:      Extraocular Movements: Extraocular movements intact.      Conjunctiva/sclera: Conjunctivae normal.      Comments: No lid lag, stare, proptosis, or periorbital edema.   Neck:      Thyroid: No thyromegaly.      Vascular: No carotid bruit.      Comments: Thyroid normal in size.  No palpable thyroid nodules.  Cardiovascular:      Rate and Rhythm: Normal rate and regular rhythm.      Heart sounds: Normal heart sounds. No murmur heard.  Pulmonary:      Effort: Pulmonary effort is normal.      Breath sounds: Normal breath sounds. No wheezing.   Abdominal:      Palpations: Abdomen is soft. "   Musculoskeletal:         General: No deformity. Normal range of motion.      Cervical back: Normal range of motion and neck supple.      Right lower leg: No edema.      Left lower leg: No edema.      Comments: No tremor of the outstretched hands.   Lymphadenopathy:      Cervical: No cervical adenopathy.   Skin:     General: Skin is warm and dry.      Findings: No rash.   Neurological:      Mental Status: She is alert and oriented to person, place, and time.      Deep Tendon Reflexes: Reflexes are normal and symmetric.      Comments: Patellar deep tendon reflexes normal.         The history was obtained from the review of the chart and from the patient.    Lab Results:      Blood work performed on 2/5/2024 demonstrated the following results:    Lab Results   Component Value Date    TSH 0.21 (L) 02/05/2024    FREET4 2.0 (H) 02/05/2024       Future Appointments   Date Time Provider Department Center   3/6/2025  8:00 AM Linh Clemens MD [facility]

## 2024-03-25 NOTE — PATIENT INSTRUCTIONS
The thyroid blood work is overactive now.     Decrease the levothyroxine 100 mcg daily.     Recheck blood work in about 3 months.     Follow up in 1 year with blood work.

## 2024-04-01 ENCOUNTER — DOCUMENTATION (OUTPATIENT)
Dept: ENDOCRINOLOGY | Facility: HOSPITAL | Age: 54
End: 2024-04-01

## 2024-06-21 ENCOUNTER — TELEPHONE (OUTPATIENT)
Dept: ENDOCRINOLOGY | Facility: HOSPITAL | Age: 54
End: 2024-06-21

## 2024-06-21 LAB
T4 FREE SERPL-MCNC: 1.6 NG/DL (ref 0.8–1.8)
TSH SERPL-ACNC: 0.13 MIU/L

## 2024-06-21 NOTE — TELEPHONE ENCOUNTER
I called and left a voicemail for the patient to call the office back concerning her test results.

## 2024-06-21 NOTE — TELEPHONE ENCOUNTER
----- Message from Linh Clemens MD sent at 6/21/2024 10:05 AM EDT -----  Call patient.  Her most recent thyroid blood work does still show a low TSH but the T4 is improved.  We can follow this over time if she is feeling reasonable and repeat her blood work in another 3 months or so.  If still the same, we can then decrease her thyroid hormone further.

## 2024-06-24 NOTE — TELEPHONE ENCOUNTER
"Patient was updated on lab results. She stated she feels \"okay,\" and is fine with waiting to recheck in 3 months. She said if she starts to feel \"lousy\" she will call to lets us know. She wanted to see if the lab orders can be mailed to her home address? I verified the one on file is up to date.   "

## 2024-09-06 DIAGNOSIS — E89.0 POSTABLATIVE HYPOTHYROIDISM: Primary | ICD-10-CM

## 2024-09-18 LAB — TSH SERPL-ACNC: 1.53 MIU/L

## 2025-02-26 ENCOUNTER — RESULTS FOLLOW-UP (OUTPATIENT)
Dept: ENDOCRINOLOGY | Facility: HOSPITAL | Age: 55
End: 2025-02-26

## 2025-02-26 LAB
T4 FREE SERPL-MCNC: 1.9 NG/DL (ref 0.8–1.8)
TSH SERPL-ACNC: 0.28 MIU/L

## 2025-03-03 DIAGNOSIS — E89.0 POSTABLATIVE HYPOTHYROIDISM: ICD-10-CM

## 2025-03-04 RX ORDER — LEVOTHYROXINE SODIUM 100 UG/1
100 TABLET ORAL DAILY
Qty: 90 TABLET | Refills: 0 | Status: SHIPPED | OUTPATIENT
Start: 2025-03-04

## 2025-03-06 ENCOUNTER — OFFICE VISIT (OUTPATIENT)
Dept: ENDOCRINOLOGY | Facility: HOSPITAL | Age: 55
End: 2025-03-06
Payer: COMMERCIAL

## 2025-03-06 VITALS
OXYGEN SATURATION: 99 % | SYSTOLIC BLOOD PRESSURE: 98 MMHG | DIASTOLIC BLOOD PRESSURE: 60 MMHG | WEIGHT: 129 LBS | HEART RATE: 73 BPM | HEIGHT: 63 IN | BODY MASS INDEX: 22.86 KG/M2

## 2025-03-06 DIAGNOSIS — E89.0 POSTABLATIVE HYPOTHYROIDISM: Primary | ICD-10-CM

## 2025-03-06 DIAGNOSIS — E05.00 GRAVES DISEASE: ICD-10-CM

## 2025-03-06 DIAGNOSIS — Z92.3 STATUS POST RADIOACTIVE IODINE THYROID ABLATION: ICD-10-CM

## 2025-03-06 PROCEDURE — 99214 OFFICE O/P EST MOD 30 MIN: CPT | Performed by: INTERNAL MEDICINE

## 2025-03-06 NOTE — PROGRESS NOTES
3/9/2025    Assessment & Plan      Diagnoses and all orders for this visit:    Postablative hypothyroidism  -     T4, free; Future  -     TSH, 3rd generation; Future  -     T4, free; Future  -     TSH, 3rd generation; Future    Graves disease  -     T4, free; Future  -     TSH, 3rd generation; Future    Status post radioactive iodine thyroid ablation  -     T4, free; Future  -     TSH, 3rd generation; Future        Assessment & Plan  1. Hypothyroidism post I-131 treatment.  Her thyroid function tests indicate mild hyperactivity, potentially due to recent illnesses that may have transiently affected her thyroid levels. She reports no symptoms of overactive thyroid such as heat intolerance, tremors, or palpitations. Laboratory results from September 2024 were within normal limits on the same dose of thyroid hormone. She will maintain her current regimen of levothyroxine 100 mcg daily. A re-evaluation of her thyroid function tests will be conducted in approximately 3 months. If her thyroid levels remain stable, no changes to her medication will be made, and a follow-up with repeat blood work will be scheduled for a year later. If she experiences symptoms of hyperthyroidism, she is advised to contact the office immediately.    2.  Graves' disease.  She has no evidence of Graves' up some apathy.    I will have her get a TSH and free T4 performed in 3 months.    I have asked her to follow-up in 1 year with preceding TSH and free T4.    I have spent a total time of 30 minutes in caring for this patient on the day of the visit/encounter including Diagnostic results, Prognosis, Risks and benefits of tx options, Instructions for management, Patient and family education, Importance of tx compliance, Risk factor reductions, Impressions, Counseling / Coordination of care, Documenting in the medical record, Reviewing/placing orders in the medical record (including tests, medications, and/or procedures), and Obtaining or reviewing  history  .      CC: Hypothyroid, Graves' follow-up    History of Present Illness    HPI: Vandana Linares is a 54-year-old female with a history of hypothyroidism, post I-131 treatment for Graves' disease, here for a follow-up visit.     She was diagnosed with Graves' disease many years ago and underwent I-131 treatment to ablate her thyroid and is now on thyroid hormone for replacement purposes.    She continues her regimen of levothyroxine 100 mcg, which was reduced from 112 mcg in June 2024.  She reports no significant changes in her condition. She has experienced a weight loss of approximately 6 to 7 pounds since the previous year, which she attributes to regular gym attendance. Her weight has remained stable since June 2024, fluctuating between 126 and 128 pounds. She has been amenorrheic for the past 4 to 5 years.     She reports no symptoms of heat intolerance, excessive sweating, cold intolerance, tremors, or palpitations. Her heart rate is recorded at 73 beats per minute. She also reports no gastrointestinal disturbances such as diarrhea or constipation. Her sleep pattern is generally good, with occasional disturbances. She does not experience fatigue, dry skin, brittle nails, hair loss, or double vision. She had a brief illness in December 2024, during which her liver enzymes were elevated, possibly due to a viral infection. She suspects she may have contracted influenza during a trip to [place]. She has not been ill since January 2025. She is not currently taking biotin or any hair, skin, and nail supplements. She experienced a period of stress in October 2024 due to her 's job loss, but he has since found employment. She has resumed taking Estroven, a soy-based supplement, after discontinuing it due to hot flashes. She recalls feeling well during her last visit but noticed some  palpitations during exercise.         Historical Information   Past Medical History:   Diagnosis Date    Anxiety      "Glaucoma     IBS (irritable bowel syndrome)     Renal stones     Stenosis of tear duct     left     Past Surgical History:   Procedure Laterality Date    EYE SURGERY Left     tear duct surgery as a child    LITHOTRIPSY      TONSILLECTOMY      TRABECULECTOMY Bilateral 2019    TUBAL LIGATION Bilateral      Social History   Social History     Substance and Sexual Activity   Alcohol Use Not Currently     Social History     Substance and Sexual Activity   Drug Use Never     Social History     Tobacco Use   Smoking Status Never   Smokeless Tobacco Never     Family History:   Family History   Problem Relation Age of Onset    Hypertension Mother     Hypothyroidism Father     Hypertension Father     Thyroid disease unspecified Sister         graves, post I131    Hypothyroidism Sister     Diabetes type I Sister     Thyroid disease unspecified Maternal Grandmother     Thyroid disease unspecified Sister     No Known Problems Daughter     Diabetes type I Daughter     Thyroid disease unspecified Daughter         has thyroid antibodies    Hashimoto's thyroiditis Daughter        Meds/Allergies   Current Outpatient Medications   Medication Sig Dispense Refill    Flaxseed, Linseed, (FLAXSEED OIL PO) Take by mouth daily      FLUoxetine (PROzac) 10 mg capsule Take 10 mg by mouth daily       lactobacillus acidophilus Take 1 capsule by mouth daily      levothyroxine 100 mcg tablet TAKE 1 TABLET DAILY 90 tablet 0    Multiple Vitamins-Minerals (MULTIVITAMIN WITH MINERALS) tablet Take 1 tablet by mouth daily      Omega-3 Fatty Acids (FISH OIL) 1,000 mg Take 1,000 mg by mouth 2 (two) times a day       Red Yeast Rice 600 MG CAPS Take by mouth daily       No current facility-administered medications for this visit.     No Known Allergies    Objective   Vitals: Blood pressure 98/60, pulse 73, height 5' 3\" (1.6 m), weight 58.5 kg (129 lb), SpO2 99%.  Invasive Devices       None                   Physical Exam    No lid lag, stare, proptosis or " periorbital edema.  Thyroid is normal in size. No palpable thyroid nodules.  Lungs are clear to auscultation.  Heart has a regular rate and rhythm. No murmurs.  No tremor of the outstretched hands. Patellar deep tendon reflexes are normal.      The history was obtained from the review of the chart and from the patient.    Lab Results:     Blood work performed on 2/26/2025 demonstrates the following results:    Lab Results   Component Value Date    TSH 0.28 (L) 02/26/2025    FREET4 1.9 (H) 02/26/2025             Future Appointments   Date Time Provider Department Center   3/11/2026  8:00 AM Linh Clemens MD [facility]

## 2025-03-06 NOTE — PATIENT INSTRUCTIONS
The thyroid blood work is overactive, maybe due to the recent illnesses.     Let's continue the same levothyroxine 100 mcg daily for now.     We'll recheck blood work in about 3 months.     Follow up in 1 year with blood work.

## 2025-05-30 LAB
T4 FREE SERPL-MCNC: 1.9 NG/DL (ref 0.8–1.8)
TSH SERPL-ACNC: 0.28 MIU/L

## 2025-06-06 ENCOUNTER — RESULTS FOLLOW-UP (OUTPATIENT)
Dept: ENDOCRINOLOGY | Facility: HOSPITAL | Age: 55
End: 2025-06-06

## 2025-06-11 DIAGNOSIS — E89.0 POSTABLATIVE HYPOTHYROIDISM: Primary | ICD-10-CM

## 2025-06-23 DIAGNOSIS — E89.0 POSTABLATIVE HYPOTHYROIDISM: ICD-10-CM

## 2025-06-23 RX ORDER — LEVOTHYROXINE SODIUM 100 UG/1
100 TABLET ORAL DAILY
Qty: 90 TABLET | Refills: 1 | Status: SHIPPED | OUTPATIENT
Start: 2025-06-23